# Patient Record
Sex: FEMALE | Race: WHITE
[De-identification: names, ages, dates, MRNs, and addresses within clinical notes are randomized per-mention and may not be internally consistent; named-entity substitution may affect disease eponyms.]

---

## 2017-02-28 ENCOUNTER — HOSPITAL ENCOUNTER (OUTPATIENT)
Dept: HOSPITAL 62 - OD | Age: 57
End: 2017-02-28
Attending: PAIN MEDICINE
Payer: COMMERCIAL

## 2017-02-28 DIAGNOSIS — I49.9: Primary | ICD-10-CM

## 2017-02-28 PROCEDURE — 93010 ELECTROCARDIOGRAM REPORT: CPT

## 2017-02-28 PROCEDURE — 93005 ELECTROCARDIOGRAM TRACING: CPT

## 2017-10-10 ENCOUNTER — HOSPITAL ENCOUNTER (OUTPATIENT)
Dept: HOSPITAL 62 - RAD | Age: 57
End: 2017-10-10
Attending: PAIN MEDICINE
Payer: COMMERCIAL

## 2017-10-10 DIAGNOSIS — M54.17: Primary | ICD-10-CM

## 2017-10-10 PROCEDURE — 72148 MRI LUMBAR SPINE W/O DYE: CPT

## 2017-10-10 NOTE — RADIOLOGY REPORT (SQ)
EXAM DESCRIPTION:  MRI LUMBAR SPINE WITHOUT



COMPLETED DATE/TIME:  10/10/2017 9:36 am



REASON FOR STUDY:  RADICULOPATHY, LUMBOSACRAL REGION M54.17  RADICULOPATHY, LUMBOSACRAL REGION



COMPARISON:  None.



TECHNIQUE:  Sagittal and Axial imaging includes T1, T2, STIR and gradient echo sequences. Coronal T2/
HASTE imaging.



LIMITATIONS:  None.



FINDINGS:  VISUALIZED UPPER ABDOMEN:  Limited evaluation. No acute or suspicious findings suggested.

SEGMENTATION: No transitional anatomy. The lowest well-developed disc space is labeled L5-S1.

ALIGNMENT: Anatomic.

VERTEBRAE: Chronic appearing 25% anterior loss of height of L2 is present.  No marrow edema

BONE MARROW: Benign hemangioma L5 vertebral body.

DISC SIGNAL: Decreased T2 weighted intervertebral discs signal at L1-2, L2-3, and L3-4.

POSTERIOR ELEMENTS:  Generally intact.  No pars defect evident.

HARDWARE: None in the spine.

CORD AND CONUS: Normal in size and signal intensity. Conus at the T12 level.

SOFT TISSUES: No aortic aneurysm seen. No bulky retroperitoneal adenopathy or mass. No paraspinal mas
s or fluid.

T11-12:  At the upper edge of the field of view.  Mild facet hypertrophy.  No central or foraminal st
enosis.

T12-L1:  Mild bilateral facet hypertrophy.  No central or foraminal stenosis.

L1-L2: Borderline central canal narrowing results from broad diffuse disc bulge and moderate bilatera
l facet and ligament hypertrophy, best shown on axial T2 image 13.  No significant foraminal narrowin
g

L2-L3: Borderline central canal narrowing results from broad diffuse mild disc bulging a.m. moderate 
bilateral facet ligament hypertrophy very no foraminal narrowing.

L3-L4: Mild diffuse posterior disc bulging and bilateral facet and ligament hypertrophy is present.  
Borderline central canal narrowing.  No significant foraminal stenosis.

L4-L5: Broad diffuse posterior disc bulging and moderate bilateral facet hypertrophy is present.  No 
central or foraminal stenosis.

L5-S1: Mild posterior disc bulging, moderate bilateral facet and ligament hypertrophy.  No central st
enosis.  Mild bilateral foraminal narrowing without exiting L5 nerve root impingement.

SACRUM: Visualized upper sacrum intact.

OTHER: No other significant findings.



IMPRESSION:  Mild diffuse degenerative changes as above



TECHNICAL DOCUMENTATION:  JOB ID:  [id number]

 [employer]- All Rights Reserved

## 2019-12-13 ENCOUNTER — HOSPITAL ENCOUNTER (EMERGENCY)
Dept: HOSPITAL 62 - ER | Age: 59
Discharge: HOME | End: 2019-12-13
Payer: SELF-PAY

## 2019-12-13 VITALS — DIASTOLIC BLOOD PRESSURE: 66 MMHG | SYSTOLIC BLOOD PRESSURE: 126 MMHG

## 2019-12-13 DIAGNOSIS — M54.41: Primary | ICD-10-CM

## 2019-12-13 DIAGNOSIS — F17.200: ICD-10-CM

## 2019-12-13 DIAGNOSIS — R05: ICD-10-CM

## 2019-12-13 DIAGNOSIS — M79.671: ICD-10-CM

## 2019-12-13 PROCEDURE — 96372 THER/PROPH/DIAG INJ SC/IM: CPT

## 2019-12-13 PROCEDURE — 99284 EMERGENCY DEPT VISIT MOD MDM: CPT

## 2019-12-13 PROCEDURE — 71046 X-RAY EXAM CHEST 2 VIEWS: CPT

## 2019-12-13 NOTE — PSYCHOLOGICAL NOTE
Psych Note





- Psych Note


Date seen by psych provider: 12/13/19


Time seen by psych provider: 15:20


Psych Note: 


Reason for Consult: Depression


Consent Permissions:none provided





Patient discloses that she has been very stressed because she went to  

her mother and bring her back to [place] to care for her.  She reports 

that her mother is diagnosed with Alzheimer's and after getting a UTI her 

symptoms became much more severe.  She reports that approximately 6 months ago 

she was able to get her mother into the nursing home.  Since then she has been 

having difficulty because she has been attempting to rent out a room in her 

home; "it never works out because when you tell people to leave they never 

leave."  She discloses that she has been on BuSpar in the past however is 

currently not had any medications.  She states she is currently trying to get 

disability.  Patient disclosed that she is currently going through the change 

and feels that her emotions are out of control at times.





Patient is alert and orientated to person, place, time and circumstance.  Mood 

is euthymic with congruent affect.  Patient denies suicidal and homicidal 

ideation.  Delusions are absent and behaviors congruent with an intact reality 

based presentation ie organized and linear thought process.  Eye contact is 

well-maintained.  Conversational speech is within normal rate, tone and prosody.

 Intellectual abilities appear to be within the average range.  Attention and c

oncentration are good.  Insight, judgment, impulse control are fair.








Diagnosis:


Deferred





Medication recommendations per [facility]'s contracted psychiatrist Dr. Juventino ARAUJO 

are as follows


None at this time








Impression\plan: Patient is cleared from acute psychiatric services.  Patient 

reports a year of stressful events.  She discloses that going through the change

causes her emotions to get out of control at times.  She is not engaged in any 

therapeutic services or is on any medications.  Patient confirms she would like 

local resource list of area providers.  This was given to the patient including 

mobile crisis contact information. Dr. Charles was consulted to care management 

of this patient; attending physicians in agreement with recommendations and 

disposition.

## 2019-12-13 NOTE — RADIOLOGY REPORT (SQ)
EXAM DESCRIPTION:  CHEST 2 VIEWS



COMPLETED DATE/TIME:  12/13/2019 4:25 pm



REASON FOR STUDY:  cough x2 weeks



COMPARISON:  None.



EXAM PARAMETERS:  NUMBER OF VIEWS: two views

TECHNIQUE: Digital Frontal and Lateral radiographic views of the chest acquired.

RADIATION DOSE: NA

LIMITATIONS: none



FINDINGS:  LUNGS AND PLEURA: Lung fields are hyperexpanded but clear.  No consolidation or effusions.
  No pneumothorax.

MEDIASTINUM AND HILAR STRUCTURES: No masses or contour abnormalities.

HEART AND VASCULAR STRUCTURES: Heart normal size.  No evidence for failure.

BONES: There is mild wedging in a mid dorsal vertebral body.  Age is indeterminate.

HARDWARE: None in the chest.

OTHER: No other significant finding.



IMPRESSION:  COPD.  No acute findings in the chest.



TECHNICAL DOCUMENTATION:  JOB ID:  [id number]

 2011 [employer]- All Rights Reserved



Reading location - IP/workstation name: [email or web address]

## 2019-12-13 NOTE — ER DOCUMENT REPORT
ED General





- General


Chief Complaint: Pain All Over


Stated Complaint: BACK/ARMS/LEGS HURT


Time Seen by Provider: 12/13/19 14:38


Primary Care Provider: 


YI MORALES MD [ACTIVE STAFF] - Follow up as needed


DORA BUNCH PA-C [ALLIED HEALTH PROFESSIONAL] - Follow up as needed


Mode of Arrival: Ambulatory


Information source: Patient


Notes: 





59-year-old female patient presenting to the emergency department with chief 

complaint of low back pain and cough.  Patient reports history of low back pain,

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  Patient denies any new injury.  

States she has had this pain for at least 3 years.  She states it is pain to the

right side of her lower back that radiates down into her right foot.  She is 

also reporting a productive cough over the last 2 weeks, she is not sure if she 

has had a fever or not but has had some chills.





TRAVEL OUTSIDE OF THE [place] IN LAST 30 DAYS: No





- Related Data


Allergies/Adverse Reactions: 


                                        





Sulfa (Sulfonamide Antibiotics) Allergy (Verified 12/13/19 14:10)


   


Penicillins Adverse Reaction (Verified 12/13/19 14:10)


   











Past Medical History





- General


Information source: Patient





- Social History


Smoking Status: Current Every Day Smoker


Frequency of alcohol use: None


Drug Abuse: None


Family History: Reviewed & Not Pertinent


Patient has suicidal ideation: No


Patient has homicidal ideation: No


Musculoskeletal Medical History: Reports Hx Arthritis


Past Surgical History: Reports: Hx Tubal Ligation





Review of Systems





- Review of Systems


Constitutional: Chills


EENT: No symptoms reported


Cardiovascular: No symptoms reported


Respiratory: Cough


Gastrointestinal: No symptoms reported


Genitourinary: No symptoms reported


Female Genitourinary: No symptoms reported


Musculoskeletal: See HPI


Skin: No symptoms reported


Hematologic/Lymphatic: No symptoms reported


Neurological/Psychological: No symptoms reported





Physical Exam





- Vital signs


Vitals: 


                                        











Temp Pulse Resp BP Pulse Ox


 


 99.1 F   82   17   142/61 H  97 


 


 12/13/19 12:43  12/13/19 12:43  12/13/19 12:43  12/13/19 12:43  12/13/19 12:43














- Notes


Notes: 





PHYSICAL EXAMINATION:





GENERAL: No acute distress.


HEAD: Atraumatic, normocephalic.





EYES: Pupils equal round and reactive to light, extraocular movements intact, 

conjunctiva are normal.





ENT: Nares patent, oropharynx clear without exudates.  Moist mucous membranes.





NECK: Normal range of motion, supple without lymphadenopathy





LUNGS: Breath sounds clear to auscultation bilaterally and equal.  No wheezes 

rales or rhonchi.





HEART: Regular rate and rhythm without murmurs





ABDOMEN: Soft, nontender, nondistended abdomen.  No guarding, no rebound.  No 

masses appreciated.





Female : No CVA tenderness.





Musculoskeletal: Normal range of motion, no pitting or edema.  No cyanosis.  

Tenderness to palpation to right lumbar paraspinous area, no vertebral 

tenderness, step-off or deformity.





NEUROLOGICAL: Cranial nerves grossly intact.  Normal speech, normal gait.  

Normal sensory, motor exams





PSYCH: Normal mood, normal affect.





SKIN: Warm, Dry, normal turgor, no rashes or lesions noted.





Course





- Re-evaluation


Re-evalutation: 





Chest x-ray was unremarkable.  Patient does have some relief of her back pain 

after administration of Flexeril and Toradol here in the emergency department.  

She will be discharged home in stable condition.  I encouraged her to please 

follow-up with primary care and or reestablish herself with pain management for 

further treatment of her chronic pain issues.  Patient verbalized understanding 

and agreement with same.





The patient's emergency department workup and current diagnosis were explained 

to the patient and or family.  Follow-up instructions were provided.  

Medications if prescribed were discussed. Instructions for when to return to the

emergency department including specific worrisome symptoms were discussed with 

the patient and/or family.








- Vital Signs


Vital signs: 


                                        











Temp Pulse Resp BP Pulse Ox


 


 98.4 F   72   16   126/66 H  100 


 


 12/13/19 17:31  12/13/19 17:31  12/13/19 17:31  12/13/19 17:31  12/13/19 17:31














Discharge





- Discharge


Clinical Impression: 


Chronic back pain


Qualifiers:


 Back pain location: low back pain Back pain laterality: unspecified Sciatica 

presence: with sciatica Sciatica laterality: sciatica of right side Qualified 

Code(s): M54.41 - Lumbago with sciatica, right side





Condition: Stable


Disposition: HOME, SELF-CARE


Additional Instructions: 


Your work-up today was unremarkable.  Please take medications as prescribed.  

Please do not take ibuprofen while taking the tramadol.  Please follow-up and 

establish yourself with pain management.  Their phone number is below.  Return 

to the emergency department for any new or worsening symptoms.





Prescriptions: 


Ketorolac Tromethamine [Toradol 10 mg Tablet] 10 mg PO Q6HP PRN #20 tablet


 PRN Reason: 


Benzonatate [Tessalon Perles 100 mg Capsule] 100 mg PO Q8HP PRN #30 capsule


 PRN Reason: 


Cyclobenzaprine HCl [Flexeril 10 mg Tablet] 10 mg PO TIDP PRN #15 tab


 PRN Reason: 


Referrals: 


YI MORALES MD [ACTIVE STAFF] - Follow up as needed


DORA BUNCH PA-C [ALLIED HEALTH PROFESSIONAL] - Follow up as needed

## 2020-06-23 ENCOUNTER — HOSPITAL ENCOUNTER (EMERGENCY)
Dept: HOSPITAL 62 - ER | Age: 60
Discharge: HOME | End: 2020-06-23
Payer: SELF-PAY

## 2020-06-23 VITALS — SYSTOLIC BLOOD PRESSURE: 126 MMHG | DIASTOLIC BLOOD PRESSURE: 71 MMHG
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
## 2020-06-23 DIAGNOSIS — R07.81: ICD-10-CM

## 2020-06-23 DIAGNOSIS — S20.212A: Primary | ICD-10-CM

## 2020-06-23 DIAGNOSIS — F17.200: ICD-10-CM

## 2020-06-23 PROCEDURE — 71046 X-RAY EXAM CHEST 2 VIEWS: CPT

## 2020-06-23 PROCEDURE — 99283 EMERGENCY DEPT VISIT LOW MDM: CPT

## 2020-06-23 NOTE — ER DOCUMENT REPORT
HPI





- HPI


Patient complains to provider of: rib pain


Time Seen by Provider: 06/23/20 19:21


Onset: Last week


Onset/Duration: Waxing and waning


Pain Level: 1


Associated Symptoms: None


Exacerbated by: Denies


Similar symptoms previously: No


Recently seen / treated by doctor: No





- REPRODUCTIVE


Reproductive: DENIES: Pregnant:





Past Medical History





- General


Information source: Patient





- Social History


Smoking Status: Current Every Day Smoker


Cigarette use (# per day): No


Chew tobacco use (# tins/day): No


Smoking Education Provided: No


Frequency of alcohol use: None


Drug Abuse: None


Family History: Reviewed & Not Pertinent


Musculoskeletal Medical History: Reports Hx Arthritis


Past Surgical History: Reports: Hx Tubal Ligation





Vertical Provider Document





- CONSTITUTIONAL


Agree With Documented VS: Yes





- INFECTION CONTROL


TRAVEL OUTSIDE OF THE [place] IN LAST 30 DAYS: No





- HEENT


HEENT: Atraumatic, Conjuctival Injection, Normocephalic, PERRLA





- NECK


Neck: Normal Inspection





- RESPIRATORY


Respiratory: Breath Sounds Normal, No Respiratory Distress





- CARDIOVASCULAR


Cardiovascular: Regular Rate, Regular Rhythm





- GI/ABDOMEN


Gastrointestinal: Abdomen Soft, Abdomen Non-Tender





- REPRODUCTIVE


Female Genitalia: Normal Inspection





- BACK


Back: Normal Inspection





- MUSCULOSKELETAL/EXTREMETIES


Musculoskeletal/Extremeties: MAEW





Course





- Re-evaluation


Re-evalutation: 





06/23/20 19:25


Lungs clear to auscultation clear in all fields.  No fever no nausea no 

vomiting.  The fall occurred over a week ago.





- Vital Signs


Vital signs: 


                                        











Temp Pulse Resp BP Pulse Ox


 


 98.2 F   92   20   126/71 H  95 


 


 06/23/20 18:45  06/23/20 18:45  06/23/20 18:45  06/23/20 18:45  06/23/20 18:45














- Diagnostic Test


Radiology results interpreted by me: 





06/23/20 19:25


                                        





Chest X-Ray  06/23/20 18:39


IMPRESSION:  NO ACUTE RADIOGRAPHIC FINDING IN THE CHEST.


 














Discharge





- Discharge


Clinical Impression: 


Contusion of rib


Qualifiers:


 Encounter type: initial encounter Laterality: left Qualified Code(s): S20.212A 

- Contusion of left front wall of thorax, initial encounter





Disposition: HOME, SELF-CARE


Instructions:  Rib Contusion ([facility])


Prescriptions: 


Methocarbamol [Robaxin 750 mg Tablet] 750 mg PO ASDIR PRN #40 tablet


 PRN Reason: 


Acetaminophen with Codeine [Tylenol with Codeine #3 Tablet] 1 each PO Q4 PRN #20

tablet


 PRN Reason:

## 2020-06-23 NOTE — RADIOLOGY REPORT (SQ)
EXAM DESCRIPTION:  CHEST 2 VIEWS



IMAGES COMPLETED DATE/TIME:  6/23/2020 5:59 pm



REASON FOR STUDY:  pain



COMPARISON:  12/13/2019



EXAM PARAMETERS:  NUMBER OF VIEWS: two views

TECHNIQUE: Digital Frontal and Lateral radiographic views of the chest acquired.

RADIATION DOSE: NA

LIMITATIONS: none



FINDINGS:  LUNGS AND PLEURA: No opacities, masses or pneumothorax. No pleural effusion.

MEDIASTINUM AND HILAR STRUCTURES: No masses or contour abnormalities.

HEART AND VASCULAR STRUCTURES: Heart normal size.  No evidence for failure.

BONES: No acute findings.

HARDWARE: None in the chest.

OTHER: No other significant finding.



IMPRESSION:  NO ACUTE RADIOGRAPHIC FINDING IN THE CHEST.



TECHNICAL DOCUMENTATION:  JOB ID:  [id number]

 2011 [employer]- All Rights Reserved



Reading location - IP/workstation name: [email or web address]